# Patient Record
Sex: MALE | Race: WHITE
[De-identification: names, ages, dates, MRNs, and addresses within clinical notes are randomized per-mention and may not be internally consistent; named-entity substitution may affect disease eponyms.]

---

## 2019-09-23 ENCOUNTER — HOSPITAL ENCOUNTER (OUTPATIENT)
Dept: HOSPITAL 47 - RADXRMAIN | Age: 74
Discharge: HOME | End: 2019-09-23
Attending: INTERNAL MEDICINE
Payer: MEDICARE

## 2019-09-23 DIAGNOSIS — R07.9: ICD-10-CM

## 2019-09-23 DIAGNOSIS — M47.812: ICD-10-CM

## 2019-09-23 DIAGNOSIS — M48.02: Primary | ICD-10-CM

## 2019-09-23 PROCEDURE — 71110 X-RAY EXAM RIBS BIL 3 VIEWS: CPT

## 2019-09-23 PROCEDURE — 72050 X-RAY EXAM NECK SPINE 4/5VWS: CPT
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
## 2019-09-23 NOTE — XR
EXAMINATION TYPE: XR cervical spine comp

 

DATE OF EXAM: 9/23/2019

 

COMPARISON: None

 

HISTORY: 74-year-old male bilateral chest pain, neck pain

 

TECHNIQUE: 5 views

 

FINDINGS:  

The predental space widening. Difficulty visualizing the prevertebral space due to excessive flexion 
the neck. Moderate multilevel endplate spondylosis. Hypertrophic facet and uncovertebral joint arthro
mine. This contributes to moderate to severe right-sided neuroforaminal stenosis at C2-C3 and C3-C4 
and moderate on the left at C4-C5. Normal odontoid view. C7-T1 is obscured by the patient's shoulders
. Remaining alignment is maintained.

 

 

IMPRESSION:  

 

1. Unable to adequately assess the prevertebral soft tissues due to neck positioning. If clinical catherine
picion for an occult underlying osseous injury, CT can be performed.

2. Moderate spondylotic change. The cervicothoracic junction is obscured by the patient's shoulders a
nd not assessed. The remaining cervical alignment is maintained.

3. Bony neuroforaminal stenoses as outlined above, greatest in the right upper cervical spine.